# Patient Record
Sex: MALE | URBAN - METROPOLITAN AREA
[De-identification: names, ages, dates, MRNs, and addresses within clinical notes are randomized per-mention and may not be internally consistent; named-entity substitution may affect disease eponyms.]

---

## 2021-04-26 ENCOUNTER — TELEPHONE (OUTPATIENT)
Dept: SURGERY | Age: 21
End: 2021-04-26

## 2022-07-20 ENCOUNTER — OFFICE (OUTPATIENT)
Dept: URBAN - METROPOLITAN AREA CLINIC 18 | Facility: CLINIC | Age: 22
End: 2022-07-20

## 2022-07-20 DIAGNOSIS — K92.2 GASTROINTESTINAL HEMORRHAGE, UNSPECIFIED: ICD-10-CM

## 2022-07-20 PROCEDURE — 91110 GI TRC IMG INTRAL ESOPH-ILE: CPT | Performed by: INTERNAL MEDICINE

## 2022-11-19 ENCOUNTER — HOSPITAL ENCOUNTER (EMERGENCY)
Age: 22
Discharge: HOME | End: 2022-11-19
Attending: EMERGENCY MEDICINE
Payer: COMMERCIAL

## 2022-11-19 ENCOUNTER — APPOINTMENT (OUTPATIENT)
Dept: GENERAL RADIOLOGY | Age: 22
End: 2022-11-19
Payer: COMMERCIAL

## 2022-11-19 VITALS
DIASTOLIC BLOOD PRESSURE: 81 MMHG | TEMPERATURE: 98 F | OXYGEN SATURATION: 97 % | HEART RATE: 90 BPM | HEIGHT: 71 IN | WEIGHT: 170 LBS | BODY MASS INDEX: 23.8 KG/M2 | SYSTOLIC BLOOD PRESSURE: 112 MMHG | RESPIRATION RATE: 18 BRPM

## 2022-11-19 DIAGNOSIS — S93.401A SPRAIN OF RIGHT ANKLE, UNSPECIFIED LIGAMENT, INITIAL ENCOUNTER: Primary | ICD-10-CM

## 2022-11-19 PROCEDURE — 73610 X-RAY EXAM OF ANKLE: CPT

## 2022-11-19 PROCEDURE — 99283 EMERGENCY DEPT VISIT LOW MDM: CPT

## 2022-11-19 NOTE — ED PROVIDER NOTES
Emergency Department Encounter    Patient: Luci Jackson  MRN: [id number]  : 2000  Date of Evaluation: 2022  ED Provider:  Braden Skelton MD    Triage Chief Complaint:   Ankle Pain    [place]:  Luci Jackson is a 25 y.o. male that presents complaining of moderate right ankle pain on the medial aspect of the right ankle in the region of the medial malleolus after playing basketball and having another player fall on the ankle. Patient did hear a pop. No breaks in the skin. Patient was able to ambulate with some degree of discomfort on the foot after the injury. No other traumatic injuries. No radiation. No treatment prior to arrival other than application of a bandage for support. ROS - see HPI, below listed is current ROS at time of my eval:  General:  No fevers  Musculoskeletal: Right ankle pain as above  Skin:  No rash, no pruritis, no easy bruising  Neurologic:   no extremity numbness, no extremity tingling, no extremity weakness  Extremities:  no edema    History reviewed. No pertinent past medical history. History reviewed. No pertinent surgical history. History reviewed. No pertinent family history.   Social History     Socioeconomic History    Marital status: Unknown     Spouse name: Not on file    Number of children: Not on file    Years of education: Not on file    Highest education level: Not on file   Occupational History    Not on file   Tobacco Use    Smoking status: Never    Smokeless tobacco: Never   Substance and Sexual Activity    Alcohol use: Never    Drug use: Never    Sexual activity: Not on file   Other Topics Concern    Not on file   Social History Narrative    Not on file     Social Determinants of Health     Financial Resource Strain: Not on file   Food Insecurity: Not on file   Transportation Needs: Not on file   Physical Activity: Not on file   Stress: Not on file   Social Connections: Not on file   Intimate Partner Violence: Not on file   Housing Stability: Not on file     No current facility-administered medications for this encounter. No current outpatient medications on file. No Known Allergies    Nursing Notes Reviewed    Physical Exam:  Triage VS:    ED Triage Vitals   Enc Vitals Group      BP 11/19/22 1715 112/81      Heart Rate 11/19/22 1714 90      Resp 11/19/22 1714 18      Temp 11/19/22 1714 98 °F (36.7 °C)      Temp Source 11/19/22 1714 Infrared      SpO2 11/19/22 1714 97 %      Weight 11/19/22 1714 170 lb (77.1 kg)      Height 11/19/22 1714 5' 11\" (1.803 m)      Head Circumference --       Peak Flow --       Pain Score --       Pain Loc --       Pain Edu? --       Excl. in [address]? --        General appearance:  No acute distress. Skin:  Warm. Dry. Ears, nose, mouth and throat:  Oral mucosa moist   Extremity:  No swelling. Normal ROM. Patient has no tenderness to palpation throughout the foot to include the base of the fifth metatarsal.  No tenderness palpation proximal fibula. Patient does have tenderness palpation over the right medial malleolus. Patient is neurovascularly intact throughout. Perfusion:  intact          Neurological:  Alert and oriented times 3. Motor and sensory exam intact to affected extremity    I have reviewed and interpreted all of the currently available lab results from this visit (if applicable):  No results found for this visit on 11/19/22. Radiographs (if obtained):  Radiologist's Report Reviewed:  XR ANKLE RIGHT (MIN 3 VIEWS)    Result Date: 11/19/2022  EXAMINATION: THREE XRAY VIEWS OF THE RIGHT ANKLE 11/19/2022 2:19 pm COMPARISON: None. HISTORY: ORDERING SYSTEM PROVIDED HISTORY: R ankle pain s/p injury playing basketball TECHNOLOGIST PROVIDED HISTORY: Reason for exam:->R ankle pain s/p injury playing basketball Reason for Exam: R ankle pain s/p injury playing basketball Additional signs and symptoms: medial rt ankle pain and swelling FINDINGS: The medial and lateral malleoli are intact. Ankle mortise is congruent. Talar dome is intact. Subtalar joint is unremarkable, with maintenance of the anterior process of the calcaneus. Medial soft tissue swelling is noted. Medial soft tissue swelling. No acute bony abnormality. MDM:  Patient presented with musculoskeletal complaints. Based on patient's presentation, history and physical exam presentation appears most consistent with a muscular/ligamentous injury. X-ray evaluation revealed no evidence of fracture or dislocation involving the right ankle. .  No evidence of additional traumatic injury by history or physical exam.  Patient does not have any evidence of compartment syndrome, necrotizing process, deep venous thrombosis, or neurovascular deficits. The patient understands that at this time there is no evidence for a more significant underlying process, and that early in a process of such an injury and initial workup can be falsely negative. Patient's symptoms will be treated symptomatically with over-the-counter medications. Patient declined treatment with Ace wrap. Patient was provided crutches. RICE therapy. They will be discharged to follow-up as an outpatient, they understand and agree with the plan, return warnings given. Clinical Impression:  1. Sprain of right ankle, unspecified ligament, initial encounter      Disposition referral (if applicable):  [facility] - [facility] [address]  [phone]    In 1 week      Disposition medications (if applicable): There are no discharge medications for this patient. ED Provider Disposition Time  DISPOSITION Decision To Discharge 11/19/2022 06:20:44 PM      Comment: Please note this report has been produced using speech recognition software and may contain errors related to that system including errors in grammar, punctuation, and spelling, as well as words and phrases that may be inappropriate.  If there are any questions or concerns please feel free to contact the dictating provider for clarification.        Loli Garcia MD  11/25/22 1562

## 2022-11-19 NOTE — ED NOTES
Patient verbalizes understanding of discharge instructions. Patient walks out of ED on crutches with even and unlabored respirations.       Becca Will RN  11/19/22 [id number]

## 2022-11-19 NOTE — ED NOTES
Pt presents to ED with c/o right ankle pain. Pt states he rolled his ankle while playing basketball.      Chichi Tierney RN  11/19/22 6722